# Patient Record
Sex: MALE | Race: ASIAN | NOT HISPANIC OR LATINO | URBAN - METROPOLITAN AREA
[De-identification: names, ages, dates, MRNs, and addresses within clinical notes are randomized per-mention and may not be internally consistent; named-entity substitution may affect disease eponyms.]

---

## 2019-08-20 ENCOUNTER — EMERGENCY (EMERGENCY)
Facility: HOSPITAL | Age: 44
LOS: 1 days | Discharge: HOME | End: 2019-08-20
Attending: EMERGENCY MEDICINE
Payer: SELF-PAY

## 2019-08-20 VITALS
TEMPERATURE: 98 F | HEART RATE: 90 BPM | WEIGHT: 190.04 LBS | SYSTOLIC BLOOD PRESSURE: 137 MMHG | DIASTOLIC BLOOD PRESSURE: 89 MMHG | RESPIRATION RATE: 20 BRPM | HEIGHT: 72 IN | OXYGEN SATURATION: 99 %

## 2019-08-20 PROCEDURE — 29105 APPLICATION LONG ARM SPLINT: CPT | Mod: LT

## 2019-08-20 PROCEDURE — 99283 EMERGENCY DEPT VISIT LOW MDM: CPT | Mod: 25

## 2019-08-20 PROCEDURE — 99284 EMERGENCY DEPT VISIT MOD MDM: CPT

## 2019-08-20 PROCEDURE — 73090 X-RAY EXAM OF FOREARM: CPT | Mod: 26,LT

## 2019-08-20 PROCEDURE — 73090 X-RAY EXAM OF FOREARM: CPT

## 2019-08-20 PROCEDURE — 29105 APPLICATION LONG ARM SPLINT: CPT

## 2019-08-20 RX ORDER — IBUPROFEN 200 MG
600 TABLET ORAL ONCE
Refills: 0 | Status: COMPLETED | OUTPATIENT
Start: 2019-08-20 | End: 2019-08-20

## 2019-08-20 RX ORDER — OXYCODONE AND ACETAMINOPHEN 5; 325 MG/1; MG/1
0.5 TABLET ORAL ONCE
Refills: 0 | Status: DISCONTINUED | OUTPATIENT
Start: 2019-08-20 | End: 2019-08-20

## 2019-08-20 RX ORDER — OXYCODONE AND ACETAMINOPHEN 5; 325 MG/1; MG/1
1 TABLET ORAL ONCE
Refills: 0 | Status: DISCONTINUED | OUTPATIENT
Start: 2019-08-20 | End: 2019-08-20

## 2019-08-20 RX ORDER — IBUPROFEN 200 MG
1 TABLET ORAL
Qty: 28 | Refills: 0
Start: 2019-08-20 | End: 2019-08-26

## 2019-08-20 NOTE — ED PROVIDER NOTE - ATTENDING CONTRIBUTION TO CARE
Agree with NP H&P.  43 y/o male presents with L arm pain since yesterday after a piece of luggage fell on patient.   Pt was treated at a hospital in [facility], however comes to ED complaining of pain.  Will check x-ray, analgesia, and reassess. Agree with NP H&P.  43 y/o male presents with L arm pain since yesterday after a piece of luggage fell on patient.   Pt was treated at a hospital in [facility], however comes to ED complaining of pain.  Will check x-ray, analgesia, and reassess..

## 2019-08-20 NOTE — ED PROVIDER NOTE - PROGRESS NOTE DETAILS
Xray shows mid-ulna non-displaced fracture. Long posterior arm splint applied with sling. Will refer to ortho follow up within 1 week. Pt is well appearing walking with steady gait, stable for discharge and follow up without fail with medical doctor. I had a detailed discussion with the patient and/or guardian regarding the historical points, exam findings, and any diagnostic results supporting the discharge diagnosis. Pt educated on care and need for follow up. Strict return instructions and red flag signs and symptoms discussed with patient. Questions answered. Pt shows understanding of discharge information and agrees to follow.

## 2019-08-20 NOTE — ED ADULT NURSE NOTE - OBJECTIVE STATEMENT
Came in for left forearm pain , States he has fracture of left forearm and right arm splint was applied in [facility]. Seen and examined by Justin NP , LEFT ARM SPLINT REMOVED BY Justin NP,fingers mobile ,left forearm , no swelling , redness noted.

## 2019-08-20 NOTE — ED PROVIDER NOTE - PHYSICAL EXAMINATION
L mid ulna with no swelling, ecchymosis, or skin tenting; localized tenderness to palpation. L wrist and L elbow full range of motion w/o swelling or tenderness to palpation. Radial-ulnar pulses intact 2+ <2 sec cap refill. No sensory deficits. L mid ulna with no swelling, ecchymosis, or skin tenting; localized tenderness to palpation. L wrist and L elbow full range of motion w/o swelling or tenderness to palpation. Radial-ulnar pulses intact 2+. <2 sec cap refill. No sensory deficits.

## 2019-08-20 NOTE — ED ADULT TRIAGE NOTE - CHIEF COMPLAINT QUOTE
walked in with c/o rt arm fracture pt states happened in [facility] yesterday and pt  was seen in  [facility] hospital  and treated with sling and  splint.

## 2019-08-20 NOTE — ED ADULT NURSE NOTE - CHIEF COMPLAINT QUOTE
walked in with c/o rt arm fracture pt states happened in [facility] yesterday and pt  was seen in  [facility] hospital  and treated with sling and  splint.

## 2019-08-20 NOTE — ED PROVIDER NOTE - OBJECTIVE STATEMENT
45 y/o M pt with no significant PMHx and no significant PSHx presents to ED c/o L arm pain since yesterday. Pt reports that yesterday while in [place], luggage fell onto him and he sustained an injury to his L mid forearm; pt seen at hospital and told he has a fx and a splint was applied. Pt presents to [facility] ED for f/u and endorses pain that is sharp, moderate, and localized. Pt denies difficulty moving fingers, wrist or elbow pain, numbness, tingling, focal weakness, other injuries, or any other acute complaints. NKDA.

## 2019-08-20 NOTE — ED PROVIDER NOTE - CARE PLAN
Principal Discharge DX:	Closed nondisplaced comminuted fracture of shaft of left ulna, initial encounter

## 2019-08-20 NOTE — ED PROCEDURE NOTE - CPROC ED POST PROC CARE GUIDE1
Instructed patient/caregiver to follow-up with primary care physician./Keep the cast/splint/dressing clean and dry./Verbal/written post procedure instructions were given to patient/caregiver./Elevate the injured extremity as instructed./Instructed patient/caregiver regarding signs and symptoms of infection.

## 2020-11-10 NOTE — ED PROCEDURE NOTE - CPROC ED INDICATIONS1
fractured extremity Tazorac Counseling:  Patient advised that medication is irritating and drying.  Patient may need to apply sparingly and wash off after an hour before eventually leaving it on overnight.  The patient verbalized understanding of the proper use and possible adverse effects of tazorac.  All of the patient's questions and concerns were addressed.
